# Patient Record
Sex: FEMALE | URBAN - METROPOLITAN AREA
[De-identification: names, ages, dates, MRNs, and addresses within clinical notes are randomized per-mention and may not be internally consistent; named-entity substitution may affect disease eponyms.]

---

## 2018-04-23 ENCOUNTER — TELEPHONE (OUTPATIENT)
Dept: ORTHOPEDICS | Facility: CLINIC | Age: 73
End: 2018-04-23

## 2018-04-23 ENCOUNTER — TRANSFERRED RECORDS (OUTPATIENT)
Dept: HEALTH INFORMATION MANAGEMENT | Facility: CLINIC | Age: 73
End: 2018-04-23

## 2018-04-23 NOTE — TELEPHONE ENCOUNTER
Dr. Edwards's office called requesting patient with R leg pain that radiates to the ight calf and foot be seen for possible metastatic disease to the spine.  Patient scheduled to see Dr. Patton 4/27/18 at 2:00pm.  Films in PACS.  Only clinic note available is MRI report and that will be sent to Ganesh Logan.    Lisa Navarrete

## 2018-04-24 NOTE — TELEPHONE ENCOUNTER
FUTURE VISIT INFORMATION      FUTURE VISIT INFORMATION:    Date: 4/27/18    Time: 1400    Location: [facility]  REFERRAL INFORMATION:    Referring provider:  DR MCCALLUM    Referring providers clinic:  [facility]    Reason for visit/diagnosis  R LEG    RECORDS REQUESTED FROM:       Clinic name Comments Records Status Imaging Status   TRIA TO BE REQUESTED BY NAB PER NAB                                     RECORDS RECEIVED FROM: [facility]   DATE RECEIVED: 4/24/18   NOTES STATUS DETAILS   OFFICE NOTE from referring provider Care Everywhere 4/23/18*4/14/18   OFFICE NOTE from other specialist N/A    DISCHARGE SUMMARY from hospital N/A    DISCHARGE REPORT from the ER N/A    OPERATIVE REPORT N/A    MEDICATION LIST N/A    IMPLANT RECORD/STICKER N/A    LABS     CBC/DIFF N/A    CULTURES N/A    IMAGING  (NEED IMAGES & REPORTS) N/A    INJECTIONS DONE IN RADIOLOGY N/A    MRI In process 4/23/18 in pacs   CT SCAN N/A    XRAYS (IMAGES & REPORTS) N/A    TUMOR     PATHOLOGY  Slides & report N/A

## 2018-04-27 ENCOUNTER — PRE VISIT (OUTPATIENT)
Dept: ORTHOPEDICS | Facility: CLINIC | Age: 73
End: 2018-04-27

## 2018-04-27 ENCOUNTER — OFFICE VISIT (OUTPATIENT)
Dept: ORTHOPEDICS | Facility: CLINIC | Age: 73
End: 2018-04-27
Payer: MEDICARE

## 2018-04-27 VITALS — BODY MASS INDEX: 35.61 KG/M2 | HEIGHT: 64 IN | WEIGHT: 208.6 LBS

## 2018-04-27 DIAGNOSIS — M47.27 OSTEOARTHRITIS OF SPINE WITH RADICULOPATHY, LUMBOSACRAL REGION: Primary | ICD-10-CM

## 2018-04-27 PROBLEM — E79.0 HYPERURICEMIA: Status: ACTIVE | Noted: 2017-08-29

## 2018-04-27 RX ORDER — FUROSEMIDE 20 MG
TABLET ORAL
COMMUNITY
Start: 2017-10-18

## 2018-04-27 RX ORDER — CYCLOBENZAPRINE HCL 10 MG
10 TABLET ORAL
COMMUNITY
Start: 2018-04-23

## 2018-04-27 RX ORDER — METHYLPREDNISOLONE 4 MG
4 TABLET, DOSE PACK ORAL
COMMUNITY
Start: 2018-04-14 | End: 2018-04-27

## 2018-04-27 RX ORDER — SIMVASTATIN 40 MG
TABLET ORAL
COMMUNITY
Start: 2017-11-16

## 2018-04-27 RX ORDER — METOPROLOL SUCCINATE 50 MG/1
TABLET, EXTENDED RELEASE ORAL
COMMUNITY
Start: 2017-12-20

## 2018-04-27 RX ORDER — HYDROCODONE BITARTRATE AND ACETAMINOPHEN 5; 325 MG/1; MG/1
TABLET ORAL
COMMUNITY
Start: 2018-04-23 | End: 2018-05-23

## 2018-04-27 RX ORDER — LISINOPRIL 5 MG/1
TABLET ORAL
COMMUNITY
Start: 2017-10-27

## 2018-04-27 RX ORDER — PREDNISONE 20 MG/1
20 TABLET ORAL
COMMUNITY
Start: 2018-04-23 | End: 2018-04-30

## 2018-04-27 RX ORDER — CETIRIZINE HYDROCHLORIDE 10 MG/1
TABLET ORAL
COMMUNITY
Start: 2012-10-16

## 2018-04-27 RX ORDER — CELECOXIB 200 MG/1
CAPSULE ORAL
COMMUNITY
Start: 2017-10-18

## 2018-04-27 RX ORDER — ALLOPURINOL 300 MG/1
300 TABLET ORAL
COMMUNITY
Start: 2017-11-30 | End: 2018-11-30

## 2018-04-27 NOTE — MR AVS SNAPSHOT
"              After Visit Summary   4/27/2018    Ghazla Carrasquillo    MRN: [id number]           Patient Information     Date Of Birth          1945        Visit Information        Provider Department      4/27/2018 2:00 PM Fabrizio Patton MD [facility]        Today's Diagnoses     Osteoarthritis of spine with radiculopathy, lumbosacral region    -  1       Follow-ups after your visit        Additional Services     PHYSICAL THERAPY REFERRAL (External-Prints)       Physical Therapy Referral                  Who to contact     Please call your clinic at [phone] to:    Ask questions about your health    Make or cancel appointments    Discuss your medicines    Learn about your test results    Speak to your doctor            Additional Information About Your Visit        Sparkbrowser[employer]Neul Information     Wo[employer]Up gives you secure access to your electronic health record. If you see a primary care provider, you can also send messages to your care team and make appointments. If you have questions, please call your primary care clinic.  If you do not have a primary care provider, please call [phone] and they will assist you.      [employer]Up is an electronic gateway that provides easy, online access to your medical records. With [employer], you can request a clinic appointment, read your test results, renew a prescription or communicate with your care team.     To access your existing account, please contact your [facility] or call [phone] for assistance.        Care EveryWhere ID     This is your Care EveryWhere ID. This could be used by other organizations to access your [place] medical records  [id number]        Your Vitals Were     Height BMI (Body Mass Index)                1.615 m (5' 3.58\") 36.28 kg/m2           Blood Pressure from Last 3 Encounters:   No data found for BP    Weight from Last 3 Encounters:   04/27/18 94.6 kg (208 lb 9.6 oz)              We " Performed the Following     PHYSICAL THERAPY REFERRAL (External-Prints)          Today's Medication Changes          These changes are accurate as of 4/27/18  5:21 PM.  If you have any questions, ask your nurse or doctor.               Stop taking these medicines if you haven't already. Please contact your care team if you have questions.     methylPREDNISolone 4 MG tablet   Commonly known as:  MEDROL DOSEPAK   Stopped by:  Fabrizio Patton MD                   Information about OPIOIDS     PRESCRIPTION OPIOIDS: WHAT YOU NEED TO KNOW   You have a prescription for an opioid (narcotic) pain medicine. Opioids can cause addiction. If you have a history of chemical dependency of any type, you are at a higher risk of becoming addicted to opioids. Only take this medicine after all other options have been tried. Take it for as short a time and as few doses as possible.     Do not:    Drive. If you drive while taking these medicines, you could be arrested for driving under the influence (DUI).    Operate heavy machinery    Do any other dangerous activities while taking these medicines.     Drink any alcohol while taking these medicines.      Take with any other medicines that contain acetaminophen. Read all labels carefully. Look for the word  acetaminophen  or  Tylenol.  Ask your pharmacist if you have questions or are unsure.    Store your pills in a secure place, locked if possible. We will not replace any lost or stolen medicine. If you don t finish your medicine, please throw away (dispose) as directed by your pharmacist. The [place] Pollution Control Agency has more information about safe disposal: https://www.pca.[facility].mn.us/living-green/managing-unwanted-medications    All opioids tend to cause constipation. Drink plenty of water and eat foods that have a lot of fiber, such as fruits, vegetables, prune juice, apple juice and high-fiber cereal. Take a laxative (Miralax, milk of magnesia, Colace, Senna) if you  don t move your bowels at least every other day.          Primary Care Provider Office Phone # Fax #    Yelena Billingsley [phone]       [facility] [address] [place] [phone]        Equal Access to Services     [facility] Center[facility] : Hadii aad ku hadasho Soomaali, waaxda luqadaha, qaybta kaalmada adeegyada, waxay idiin hayaan adebelén harjindercinthya laele . So [facility] [phone].    ATENCIÓN: Si habla español, tiene a joel disposición servicios gratuitos de asistencia lingüística. Alejandro al [phone].    We comply with applicable federal civil rights laws and [place] laws. We do not discriminate on the basis of race, color, national origin, age, disability, sex, sexual orientation, or gender identity.            Thank you!     Thank you for choosing [facility]  for your care. Our goal is always to provide you with excellent care. Hearing back from our patients is one way we can continue to improve our services. Please take a few minutes to complete the written survey that you may receive in the mail after your visit with us. Thank you!             Your Updated Medication List - Protect others around you: Learn how to safely use, store and throw away your medicines at www.disposemymeds.org.          This list is accurate as of 4/27/18  5:21 PM.  Always use your most recent med list.                   Brand Name Dispense Instructions for use Diagnosis    allopurinol 300 MG tablet    ZYLOPRIM     Take 300 mg by mouth        celecoxib 200 MG capsule    celeBREX     TAKE 1 CAPSULE BY MOUTH 2TIMES DAILY.        cetirizine 10 MG tablet    zyrTEC          cyclobenzaprine 10 MG tablet    FLEXERIL     Take 10 mg by mouth        furosemide 20 MG tablet    LASIX     TAKE 1 TABLET BY MOUTH DAILY (EVERY 24 HOURS).        HYDROcodone-acetaminophen 5-325 MG per tablet    NORCO          lisinopril 5 MG tablet    PRINIVIL/ZESTRIL     TAKE 1 TABLET BY MOUTH DAILY (EVERY 24 HOURS).         metoprolol succinate 50 MG 24 hr tablet    TOPROL-XL     TAKE 1 TAB BY MOUTH DAILY.        MULTI COMPLETE PO           predniSONE 20 MG tablet    DELTASONE     Take 20 mg by mouth        simvastatin 40 MG tablet    ZOCOR     TAKE 1/2 TABLET BY MOUTH NIGHTLY.

## 2018-04-27 NOTE — PROGRESS NOTES
Chief Complaint: Right leg radiculopathy and possible lumbar spine bone lesion    History: Ghazal is a 72-year-old female who is here with her  today for further evaluation and treatment of a possible lesion in her lumbar spine.  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  Ever had back pain in the past.  She was barely able to move or walk.  He was seen by a physician who gave her a Medrol Dosepak and muscle relaxers which were somewhat helpful.  He still continued to have pain over the next couple weeks.  She was seen again and an MRI of the lumbar spine was ordered on 4/23/2018.  This did show nerve root impingement at the L5-S1 nerve root on the right.  There also was findings concerning the L2 and L3 vertebrae that radiology had read as concerning for possible metastatic lesions.    This patient reports she is otherwise healthy with no history of cancer.  She has had normal colonoscopy and mammograms.  She denies any fever, chills or night sweats.  She denies any weight changes.  She is quite active.  He is not sleeping well because of this radiating pain.  She has a bit of numbness on the bottom of her foot and her pinky toe.  She has difficulty sitting, but standing and walking is not too bad.  They had discussed possibly doing an injection but wanted to make sure there were no significant concerns really related to her spine.  She has not started physical therapy but is set up to go next week.  No other concerns.    Past medical history: Osteoarthritis, high cholesterol, hypertension    Past Surgical History:   Procedure Laterality Date     C STOMACH SURGERY PROCEDURE UNLISTED  1978    gall bladder       Family History   Problem Relation Age of Onset     DIABETES Brother      DIABETES Father      Coronary Artery Disease Father      Hypertension Mother      Hyperlipidemia  "Mother      Other Cancer Mother      Alcoholism Brother        Social History   Substance Use Topics     Smoking status: Former Smoker     Packs/day: 1.00     Years: 15.00     Types: Cigarettes     Quit date: 4/1/1976     Smokeless tobacco: Never Used     Alcohol use No         Meds:   Current Outpatient Prescriptions   Medication     allopurinol (ZYLOPRIM) 300 MG tablet     celecoxib (CELEBREX) 200 MG capsule     cetirizine (ZYRTEC) 10 MG tablet     cyclobenzaprine (FLEXERIL) 10 MG tablet     furosemide (LASIX) 20 MG tablet     lisinopril (PRINIVIL/ZESTRIL) 5 MG tablet     metoprolol succinate (TOPROL-XL) 50 MG 24 hr tablet     Multiple Vitamins-Minerals (MULTI COMPLETE PO)     predniSONE (DELTASONE) 20 MG tablet     simvastatin (ZOCOR) 40 MG tablet     HYDROcodone-acetaminophen (NORCO) 5-325 MG per tablet     No current facility-administered medications for this visit.        Allergies:    Allergies   Allergen Reactions     Adhesive Tape Rash     adhesive on tape and bandaids,, environmental       Review of Systems: Answers for HPI/ROS submitted by the patient on 4/26/2018   General Symptoms: No  Skin Symptoms: No  HENT Symptoms: No  EYE SYMPTOMS: No  HEART SYMPTOMS: No  LUNG SYMPTOMS: No  INTESTINAL SYMPTOMS: No  URINARY SYMPTOMS: No  GYNECOLOGIC SYMPTOMS: No  BREAST SYMPTOMS: No  SKELETAL SYMPTOMS: No  BLOOD SYMPTOMS: No  NERVOUS SYSTEM SYMPTOMS: No  MENTAL HEALTH SYMPTOMS: No      Physical Exam: Ht 1.615 m (5' 3.58\")  Wt 94.6 kg (208 lb 9.6 oz)  BMI 36.28 kg/m2  Zayda is a pleasant 72-year-old female who appears younger than stated age.  She is alert and oriented in mild distress.  She has difficulty sitting and sometimes has to stand during the exam for comfort.  She has some decreased sensation along the lateral border of her lower leg and foot.  Otherwise has full range of motion of her lower extremities and full strength today with resisted plantarflexion, dorsiflexion, and knee extension bilaterally.  " She is quite tender to palpation of the right buttock.  No spine tenderness.    Imaging: Outside lumbar spine x-rays and lumbar spine MRI without contrast were reviewed and we also consulted with our radiologist for review.  This shows osteoarthritis of the lumbar spine as well as impingement of the L5-S1 nerve root.  We do not see any concerning findings of the L2 or L3 vertebrae that were eluded to in the previous MRI report.    Impression: 72-year-old female with L5-S1 right-sided radiculopathy and lumbar spine osteoarthritis without concerning findings for metastatic disease on MRI    Plan: We do not see any concerning findings on MRI for this patient.  We would not recommend any further follow-up scan at this time.  We believe her symptoms are related to her L5-S1 nerve root compression.  We would recommend physical therapy and to finish out her steroid.  We would recommend waiting on an injection until she has completed a course of physical therapy to see how she feels.  Certainly if all of these interventions do not work and she continues to have pain she should follow-up with her physician.  They can call us with any questions.  She understands and agrees with the plan of care.  All questions answered.  Patient was also examined by Dr. Patton, and he agrees with the plan of care.

## 2018-04-27 NOTE — PROGRESS NOTES
Dear Dr. Billingsley,    Thank you for asking me to see Zayda for evaluation of right-sided buttock and radicular pain.  As you suggested I believe this is related to irritation of the L5-S1 nerve root on that side.  She is seen today because of the previous radiology interpretation of the possibility of metastatic disease in her upper lumbar spine.  I reviewed her MRI scan myself and discussed it with our musculoskeletal radiologist.  Please see no clear evidence of metastatic disease.  I have recommended that she proceed with the management of her low back pain with core strengthening and anti-inflammatories.    Thank you for involving us in her care.    Sincerely,        Patient was seen today in consultation for 20 minutes greater than 15 minutes spent answering questions coordinating her care reviewing her imaging studies.

## 2018-04-27 NOTE — LETTER
[facility]  [address]  [address] [place] [address]          April 27, 2018    RE:  Ghazal Carrasquillo                                                                                                                                                       [address] [address]            To whom it may concern:    Ghazal Carrasquillo is under my professional care for Osteoarthritis of spine with radiculopathy, lumbosacral region. She may return to work with the following: The employee is UNABLE to return to work until 5/7/18.    When the patient returns to work, the following restrictions apply for 4 weeks:  A) Bend: Not at all (0 hours)  B) Squat: Occasionally (1-3 hours)  C) Walk/Stand: Frequently (4-8 hours)  D) Reach Above Shoulders: Occasionally (1-3 hours)  E) .Light duty-unable to bend, stoop or twist and no lifting greater than 5 pounds.      Sincerely,        Jaclyn Ward PA-C

## 2018-04-27 NOTE — LETTER
4/27/2018       RE: Ghazal Carrasquillo  [address]  [place] [address]     Dear Colleague,    Thank you for referring your patient, Ghazal Carrasquillo, to the [facility] at [facility]. Please see a copy of my visit note below.    Chief Complaint: Right leg radiculopathy and possible lumbar spine bone lesion    History: Ghazal is a 72-year-old female who is here with her  today for further evaluation and treatment of a possible lesion in her lumbar spine.  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  Ever had back pain in the past.  She was barely able to move or walk.  He was seen by a physician who gave her a Medrol Dosepak and muscle relaxers which were somewhat helpful.  He still continued to have pain over the next couple weeks.  She was seen again and an MRI of the lumbar spine was ordered on 4/23/2018.  This did show nerve root impingement at the L5-S1 nerve root on the right.  There also was findings concerning the L2 and L3 vertebrae that radiology had read as concerning for possible metastatic lesions.    This patient reports she is otherwise healthy with no history of cancer.  She has had normal colonoscopy and mammograms.  She denies any fever, chills or night sweats.  She denies any weight changes.  She is quite active.  He is not sleeping well because of this radiating pain.  She has a bit of numbness on the bottom of her foot and her pinky toe.  She has difficulty sitting, but standing and walking is not too bad.  They had discussed possibly doing an injection but wanted to make sure there were no significant concerns really related to her spine.  She has not started physical therapy but is set up to go next week.  No other concerns.    Past medical history: Osteoarthritis, high cholesterol, hypertension    Past  "Surgical History:   Procedure Laterality Date     C STOMACH SURGERY PROCEDURE UNLISTED  1978    gall bladder       Family History   Problem Relation Age of Onset     DIABETES Brother      DIABETES Father      Coronary Artery Disease Father      Hypertension Mother      Hyperlipidemia Mother      Other Cancer Mother      Alcoholism Brother        Social History   Substance Use Topics     Smoking status: Former Smoker     Packs/day: 1.00     Years: 15.00     Types: Cigarettes     Quit date: 4/1/1976     Smokeless tobacco: Never Used     Alcohol use No         Meds:   Current Outpatient Prescriptions   Medication     allopurinol (ZYLOPRIM) 300 MG tablet     celecoxib (CELEBREX) 200 MG capsule     cetirizine (ZYRTEC) 10 MG tablet     cyclobenzaprine (FLEXERIL) 10 MG tablet     furosemide (LASIX) 20 MG tablet     lisinopril (PRINIVIL/ZESTRIL) 5 MG tablet     metoprolol succinate (TOPROL-XL) 50 MG 24 hr tablet     Multiple Vitamins-Minerals (MULTI COMPLETE PO)     predniSONE (DELTASONE) 20 MG tablet     simvastatin (ZOCOR) 40 MG tablet     HYDROcodone-acetaminophen (NORCO) 5-325 MG per tablet     No current facility-administered medications for this visit.        Allergies:    Allergies   Allergen Reactions     Adhesive Tape Rash     adhesive on tape and bandaids,, environmental       Physical Exam: Ht 1.615 m (5' 3.58\")  Wt 94.6 kg (208 lb 9.6 oz)  BMI 36.28 kg/m2  Zayda is a pleasant 72-year-old female who appears younger than stated age.  She is alert and oriented in mild distress.  She has difficulty sitting and sometimes has to stand during the exam for comfort.  She has some decreased sensation along the lateral border of her lower leg and foot.  Otherwise has full range of motion of her lower extremities and full strength today with resisted plantarflexion, dorsiflexion, and knee extension bilaterally.  She is quite tender to palpation of the right buttock.  No spine tenderness.    Imaging: Outside lumbar spine " x-rays and lumbar spine MRI without contrast were reviewed and we also consulted with our radiologist for review.  This shows osteoarthritis of the lumbar spine as well as impingement of the L5-S1 nerve root.  We do not see any concerning findings of the L2 or L3 vertebrae that were eluded to in the previous MRI report.    Impression: 72-year-old female with L5-S1 right-sided radiculopathy and lumbar spine osteoarthritis without concerning findings for metastatic disease on MRI    Plan: We do not see any concerning findings on MRI for this patient.  We would not recommend any further follow-up scan at this time.  We believe her symptoms are related to her L5-S1 nerve root compression.  We would recommend physical therapy and to finish out her steroid.  We would recommend waiting on an injection until she has completed a course of physical therapy to see how she feels.  Certainly if all of these interventions do not work and she continues to have pain she should follow-up with her physician.  They can call us with any questions.  She understands and agrees with the plan of care.  All questions answered.  Patient was also examined by Dr. Patton, and he agrees with the plan of care.        Dear Dr. Billingsley,    Thank you for asking me to see Zayda for evaluation of right-sided buttock and radicular pain.  As you suggested I believe this is related to irritation of the L5-S1 nerve root on that side.  She is seen today because of the previous radiology interpretation of the possibility of metastatic disease in her upper lumbar spine.  I reviewed her MRI scan myself and discussed it with our musculoskeletal radiologist.  Please see no clear evidence of metastatic disease.  I have recommended that she proceed with the management of her low back pain with core strengthening and anti-inflammatories.    Thank you for involving us in her care.    Patient was seen today in consultation for 20 minutes greater than 15 minutes  spent answering questions coordinating her care reviewing her imaging studies.    Again, thank you for allowing me to participate in the care of your patient.      Sincerely,    Fabrizio Patton MD

## 2018-04-27 NOTE — NURSING NOTE
"Chief Complaint   Patient presents with     Consult     Pt states that she is here today after being seen at [facility] for what she thought was a pinched nerve. She states that she was prescribed Prednisone and when she was finished with the prescription she was seen for an MRI, which detected an Abnormality of her Right Leg. Referring:  ALESSIO CHELSEA       72 year old  1945    Ht 1.615 m (5' 3.58\")  Wt 94.6 kg (208 lb 9.6 oz)  BMI 36.28 kg/m2      [id number] - [place], [place] - [address]    Allergies   Allergen Reactions     Adhesive Tape Rash     adhesive on tape and bandaids,, environmental     Current Outpatient Prescriptions   Medication     allopurinol (ZYLOPRIM) 300 MG tablet     celecoxib (CELEBREX) 200 MG capsule     cetirizine (ZYRTEC) 10 MG tablet     cyclobenzaprine (FLEXERIL) 10 MG tablet     furosemide (LASIX) 20 MG tablet     lisinopril (PRINIVIL/ZESTRIL) 5 MG tablet     metoprolol succinate (TOPROL-XL) 50 MG 24 hr tablet     Multiple Vitamins-Minerals (MULTI COMPLETE PO)     predniSONE (DELTASONE) 20 MG tablet     simvastatin (ZOCOR) 40 MG tablet     HYDROcodone-acetaminophen (NORCO) 5-325 MG per tablet     No current facility-administered medications for this visit.                      "

## 2018-05-01 ENCOUNTER — MEDICAL CORRESPONDENCE (OUTPATIENT)
Dept: HEALTH INFORMATION MANAGEMENT | Facility: CLINIC | Age: 73
End: 2018-05-01

## 2020-03-11 ENCOUNTER — HEALTH MAINTENANCE LETTER (OUTPATIENT)
Age: 75
End: 2020-03-11

## 2021-01-03 ENCOUNTER — HEALTH MAINTENANCE LETTER (OUTPATIENT)
Age: 76
End: 2021-01-03

## 2021-04-25 ENCOUNTER — HEALTH MAINTENANCE LETTER (OUTPATIENT)
Age: 76
End: 2021-04-25

## 2021-10-10 ENCOUNTER — HEALTH MAINTENANCE LETTER (OUTPATIENT)
Age: 76
End: 2021-10-10

## 2022-03-26 ENCOUNTER — HEALTH MAINTENANCE LETTER (OUTPATIENT)
Age: 77
End: 2022-03-26

## 2022-05-21 ENCOUNTER — HEALTH MAINTENANCE LETTER (OUTPATIENT)
Age: 77
End: 2022-05-21

## 2022-09-18 ENCOUNTER — HEALTH MAINTENANCE LETTER (OUTPATIENT)
Age: 77
End: 2022-09-18

## 2023-06-04 ENCOUNTER — HEALTH MAINTENANCE LETTER (OUTPATIENT)
Age: 78
End: 2023-06-04